# Patient Record
Sex: FEMALE
[De-identification: names, ages, dates, MRNs, and addresses within clinical notes are randomized per-mention and may not be internally consistent; named-entity substitution may affect disease eponyms.]

---

## 2021-03-31 ENCOUNTER — RESULT REVIEW (OUTPATIENT)
Age: 26
End: 2021-03-31

## 2023-05-08 ENCOUNTER — EMERGENCY (EMERGENCY)
Facility: HOSPITAL | Age: 28
LOS: 1 days | Discharge: HOME | End: 2023-05-08
Attending: EMERGENCY MEDICINE | Admitting: EMERGENCY MEDICINE
Payer: COMMERCIAL

## 2023-05-08 VITALS
DIASTOLIC BLOOD PRESSURE: 60 MMHG | RESPIRATION RATE: 18 BRPM | TEMPERATURE: 98 F | OXYGEN SATURATION: 99 % | HEART RATE: 98 BPM | SYSTOLIC BLOOD PRESSURE: 112 MMHG

## 2023-05-08 DIAGNOSIS — R68.84 JAW PAIN: Chronic | ICD-10-CM

## 2023-05-08 LAB
ALBUMIN SERPL ELPH-MCNC: 4.1 G/DL — SIGNIFICANT CHANGE UP (ref 3.3–5)
ALP SERPL-CCNC: 64 U/L — SIGNIFICANT CHANGE UP (ref 40–120)
ALT FLD-CCNC: 7 U/L — SIGNIFICANT CHANGE UP (ref 4–33)
ANION GAP SERPL CALC-SCNC: 14 MMOL/L — SIGNIFICANT CHANGE UP (ref 7–14)
ANISOCYTOSIS BLD QL: SLIGHT — SIGNIFICANT CHANGE UP
AST SERPL-CCNC: 10 U/L — SIGNIFICANT CHANGE UP (ref 4–32)
BASOPHILS # BLD AUTO: 0 K/UL — SIGNIFICANT CHANGE UP (ref 0–0.2)
BASOPHILS NFR BLD AUTO: 0 % — SIGNIFICANT CHANGE UP (ref 0–2)
BILIRUB SERPL-MCNC: 0.7 MG/DL — SIGNIFICANT CHANGE UP (ref 0.2–1.2)
BUN SERPL-MCNC: 10 MG/DL — SIGNIFICANT CHANGE UP (ref 7–23)
CALCIUM SERPL-MCNC: 9 MG/DL — SIGNIFICANT CHANGE UP (ref 8.4–10.5)
CHLORIDE SERPL-SCNC: 101 MMOL/L — SIGNIFICANT CHANGE UP (ref 98–107)
CO2 SERPL-SCNC: 23 MMOL/L — SIGNIFICANT CHANGE UP (ref 22–31)
CREAT SERPL-MCNC: 0.81 MG/DL — SIGNIFICANT CHANGE UP (ref 0.5–1.3)
EGFR: 101 ML/MIN/1.73M2 — SIGNIFICANT CHANGE UP
EOSINOPHIL # BLD AUTO: 0.27 K/UL — SIGNIFICANT CHANGE UP (ref 0–0.5)
EOSINOPHIL NFR BLD AUTO: 0.9 % — SIGNIFICANT CHANGE UP (ref 0–6)
GLUCOSE SERPL-MCNC: 105 MG/DL — HIGH (ref 70–99)
HCG SERPL-ACNC: <1 MIU/ML — SIGNIFICANT CHANGE UP
HCT VFR BLD CALC: 40 % — SIGNIFICANT CHANGE UP (ref 34.5–45)
HGB BLD-MCNC: 13.2 G/DL — SIGNIFICANT CHANGE UP (ref 11.5–15.5)
HYPOCHROMIA BLD QL: SLIGHT — SIGNIFICANT CHANGE UP
IANC: 24.79 K/UL — HIGH (ref 1.8–7.4)
LYMPHOCYTES # BLD AUTO: 1.55 K/UL — SIGNIFICANT CHANGE UP (ref 1–3.3)
LYMPHOCYTES # BLD AUTO: 5.2 % — LOW (ref 13–44)
MCHC RBC-ENTMCNC: 30.3 PG — SIGNIFICANT CHANGE UP (ref 27–34)
MCHC RBC-ENTMCNC: 33 GM/DL — SIGNIFICANT CHANGE UP (ref 32–36)
MCV RBC AUTO: 92 FL — SIGNIFICANT CHANGE UP (ref 80–100)
MONOCYTES # BLD AUTO: 0.51 K/UL — SIGNIFICANT CHANGE UP (ref 0–0.9)
MONOCYTES NFR BLD AUTO: 1.7 % — LOW (ref 2–14)
NEUTROPHILS # BLD AUTO: 26.94 K/UL — HIGH (ref 1.8–7.4)
NEUTROPHILS NFR BLD AUTO: 87 % — HIGH (ref 43–77)
NEUTS BAND # BLD: 3.5 % — SIGNIFICANT CHANGE UP (ref 0–6)
PLAT MORPH BLD: NORMAL — SIGNIFICANT CHANGE UP
PLATELET # BLD AUTO: 264 K/UL — SIGNIFICANT CHANGE UP (ref 150–400)
PLATELET COUNT - ESTIMATE: NORMAL — SIGNIFICANT CHANGE UP
POLYCHROMASIA BLD QL SMEAR: SLIGHT — SIGNIFICANT CHANGE UP
POTASSIUM SERPL-MCNC: 3.7 MMOL/L — SIGNIFICANT CHANGE UP (ref 3.5–5.3)
POTASSIUM SERPL-SCNC: 3.7 MMOL/L — SIGNIFICANT CHANGE UP (ref 3.5–5.3)
PROT SERPL-MCNC: 7 G/DL — SIGNIFICANT CHANGE UP (ref 6–8.3)
RBC # BLD: 4.35 M/UL — SIGNIFICANT CHANGE UP (ref 3.8–5.2)
RBC # FLD: 13.8 % — SIGNIFICANT CHANGE UP (ref 10.3–14.5)
RBC BLD AUTO: ABNORMAL
SODIUM SERPL-SCNC: 138 MMOL/L — SIGNIFICANT CHANGE UP (ref 135–145)
VARIANT LYMPHS # BLD: 1.7 % — SIGNIFICANT CHANGE UP (ref 0–6)
WBC # BLD: 29.77 K/UL — HIGH (ref 3.8–10.5)
WBC # FLD AUTO: 29.77 K/UL — HIGH (ref 3.8–10.5)

## 2023-05-08 PROCEDURE — 99223 1ST HOSP IP/OBS HIGH 75: CPT

## 2023-05-08 PROCEDURE — 70491 CT SOFT TISSUE NECK W/DYE: CPT | Mod: 26,MG

## 2023-05-08 PROCEDURE — G1004: CPT

## 2023-05-08 PROCEDURE — 93010 ELECTROCARDIOGRAM REPORT: CPT

## 2023-05-08 RX ORDER — KETOROLAC TROMETHAMINE 30 MG/ML
15 SYRINGE (ML) INJECTION ONCE
Refills: 0 | Status: DISCONTINUED | OUTPATIENT
Start: 2023-05-08 | End: 2023-05-08

## 2023-05-08 RX ORDER — SODIUM CHLORIDE 9 MG/ML
1000 INJECTION INTRAMUSCULAR; INTRAVENOUS; SUBCUTANEOUS
Refills: 0 | Status: DISCONTINUED | OUTPATIENT
Start: 2023-05-08 | End: 2023-05-12

## 2023-05-08 RX ORDER — LIDOCAINE 4 G/100G
15 CREAM TOPICAL ONCE
Refills: 0 | Status: COMPLETED | OUTPATIENT
Start: 2023-05-08 | End: 2023-05-08

## 2023-05-08 RX ORDER — KETOROLAC TROMETHAMINE 30 MG/ML
15 SYRINGE (ML) INJECTION EVERY 6 HOURS
Refills: 0 | Status: DISCONTINUED | OUTPATIENT
Start: 2023-05-08 | End: 2023-05-09

## 2023-05-08 RX ORDER — DEXAMETHASONE 0.5 MG/5ML
6 ELIXIR ORAL ONCE
Refills: 0 | Status: COMPLETED | OUTPATIENT
Start: 2023-05-08 | End: 2023-05-08

## 2023-05-08 RX ADMIN — Medication 15 MILLIGRAM(S): at 18:21

## 2023-05-08 RX ADMIN — Medication 15 MILLIGRAM(S): at 17:51

## 2023-05-08 RX ADMIN — LIDOCAINE 15 MILLILITER(S): 4 CREAM TOPICAL at 22:59

## 2023-05-08 RX ADMIN — Medication 6 MILLIGRAM(S): at 17:51

## 2023-05-08 RX ADMIN — Medication 15 MILLIGRAM(S): at 23:27

## 2023-05-08 RX ADMIN — Medication 100 MILLIGRAM(S): at 17:51

## 2023-05-08 RX ADMIN — SODIUM CHLORIDE 125 MILLILITER(S): 9 INJECTION INTRAMUSCULAR; INTRAVENOUS; SUBCUTANEOUS at 22:56

## 2023-05-08 RX ADMIN — Medication 15 MILLIGRAM(S): at 22:57

## 2023-05-08 NOTE — ED PROVIDER NOTE - ATTENDING APP SHARED VISIT CONTRIBUTION OF CARE
Gong: I have seen and examined the patient face to face, have reviewed and addended the HPI, PE and a/p as necessary.    28 yo F with no PMH a/w L sided throat pain and syncope today.  Pt reports she has been having L sided throat pain x 3 days.  Noted to have progressively worsening now with change in voice and is unable to eat.  Reports having some drooling.  Noted today to feel weak and while walking her dog she syncopized.  There was no head trauma.  Patient returned to normal conscious level within 2 seconds or less.  Upon waking up patient had no confusion or hazy like state.  Denies facial swelling, shortness of breath, dyspnea on exertion, lightheadedness, dizziness, fever, chills, neck swelling, neck stiffness, cough, congestion, abdominal pain, nausea, vomiting, diarrhea, rash, confusion, seizure-like activity, paresthesias, weakness, tinnitus, nosebleed, loose teeth, tasting blood, jaw pain, inability to open mouth, gum swelling, gum bleeding.    Noted to be seen in urgent care and was noted to be orthostatic on vitals.     GEN - NAD; well appearing; A+O x3; non-toxic appearing  HEAD: NCAT; EYES/NOSE: PERRLA, EOMI, no discharge; THROAT: Oral cavity and pharynx normal. + exudates with swelling on L paratonsilar area, minimal uvula deviation.    CARD -s1s2, RRR, no M,G,R;   PULM - CTA b/l, symmetric breath sounds;   ABD -  +BS, ND, NT, soft, no guarding, no rebound, no masses;   BACK - no CVA tenderness, Normal  spine;   EXT - symmetric pulses, 2+ dp, capillary refill < 2 seconds, no cyanosis, no edema;   NEURO - no focal neuro deficits, no slurred speech    Likely dehydration related syncope.  No chest pain, no shortness of breath eval for electrolyte abnl.  EKG wnl with no acute st or t wave changes.  Will obtain ct of soft tissue neck to r/o pta.  give steroids ivf, toradol and clina.  Reassess.  Possible need for ENT consult pending CT.

## 2023-05-08 NOTE — ED CDU PROVIDER INITIAL DAY NOTE - PHYSICAL EXAMINATION
Vital signs reviewed.   CONSTITUTIONAL: Pt appears uncomfortable, spitting up some saliva, in no apparent distress. Non-toxic appearing.   HEAD: Normocephalic, atraumatic.  EYES:  conjunctiva and sclera WNL.  ENT: normal nose; no rhinorrhea; Pt speaking in full sentences. Airway intact. +tonsillitis   NECK: Trachea midline   CARD: Normal S1, S2;   RESP: Normal chest excursion with respiration; breath sounds clear and equal bilaterally; no wheezes, rhonchi, or rales.  EXT/MS: moves all extremities;   SKIN: Normal for age and race; warm; dry; good turgor; no apparent lesions or exudate noted.  NEURO: Awake, alert, oriented x 3, no gross deficits  PSYCH: Normal mood; appropriate affect.

## 2023-05-08 NOTE — ED ADULT NURSE REASSESSMENT NOTE - NS ED NURSE REASSESS COMMENT FT1
pt a%ox4 c/o pain consistent with chief complaint. pt medicated as per md orders. pt respirations even and unlabored pt tolerating secretions. 20g to the LAC and RAC with no redness or swelling. pt pending CDU bed. pt appears in NAD

## 2023-05-08 NOTE — ED CDU PROVIDER INITIAL DAY NOTE - OBJECTIVE STATEMENT
Patient 27-year-old female no known pmhx who presents to ED c/o lt side throat pain x4 days. As per patient states that sx began on saturday with sore throat. Notes over past few days she has had increasing pain with swallowing and decreased PO intake. Pain worse on Lt side, notes increased secretions. Denies fevers, chills, prior tonsillitis/PTA, n/v/d, difficulty breathing, recent dental work/procedures or any other complaints.  Pt seen and worked up in ED; labs show wbc 29.7, other labs wnl, ct neck shows "Acute left palatine tonsillitis without evidence of peritonsillar or retropharyngeal abscess formation". Pt transferred to cdu for: iv abx, decadron, pain control, ADAT, vitals q4, am labs and frequent re-evals.

## 2023-05-08 NOTE — ED PROVIDER NOTE - THROAT FINDINGS
lt tonsil swollen with whit discharge. Pt spitting up salvia but no drooling/no redness/uvula midline/PERITONSILLAR ABSCESS/TONSILLAR SWELLING

## 2023-05-08 NOTE — ED PROVIDER NOTE - OBJECTIVE STATEMENT
27-year-old otherwise healthy female presents with pain in the left side of her throat for the past 3 days.      Patient states she had a telehealth visit 2 days ago and was prescribed steroids or Aleve but never started the medication.  Patient states the pain has persistently gotten worse, and she is now noticing a change in her voice that sounds muffled.  Patient states due to the pain she is unable to eat.  States today has some increased drooling.  Patient states due to an ability to eat for the past 2 days felt faint today, and had 1 episode of syncope.  Episode of syncope was witnessed, there was no head trauma.  Patient returned to normal conscious level within 2 seconds or less.  Upon waking up patient had no confusion or hazy like state.  Patient states despite increased drooling and decreased p.o. intake she is still able to breathe and has no symptoms of shortness of breath or feeling air hungry.    Patient declines facial swelling, shortness of breath, dyspnea on exertion, lightheadedness, dizziness, fever, chills, neck swelling, neck stiffness, cough, congestion, abdominal pain, nausea, vomiting, diarrhea, rash, confusion, seizure-like activity, paresthesias, weakness, tinnitus, nosebleed, loose teeth, tasting blood, jaw pain, inability to open mouth, gum swelling, gum bleeding.

## 2023-05-08 NOTE — ED ADULT TRIAGE NOTE - CHIEF COMPLAINT QUOTE
p.t c/o of rt sided throat pain and swelling for 2 days, p/t had a syncopal episode this am, unable to eat or drink for past 2 days due to pain, no neuro deficits or airway issues noted

## 2023-05-08 NOTE — ED PROVIDER NOTE - NSFOLLOWUPINSTRUCTIONS_ED_ALL_ED_FT
You were seen in the Emergency Room today and found to have a tonsillar infection/abscess.    Please take all medications as prescribed.     prompt reevaluation is necessary for:    ?Dyspnea    ?Worsening throat pain, neck pain, or trismus    ?Enlarging mass    ?Fever    ?Neck stiffness    ?Bleeding    If an abscess was drained you should have follow-up in 24 to 36 hours as per ENT. Please return if any new or worsening symptoms.

## 2023-05-08 NOTE — ED PROVIDER NOTE - CONSTITUTIONAL, MLM
normal... awake, alert, oriented to person, place, time/situation and in no apparent distress. Appears well but is constantly spitting up couch and has muffled voice.

## 2023-05-08 NOTE — ED PROVIDER NOTE - PROGRESS NOTE DETAILS
RIA Thomas : patient signed out to me to reassess after CT scan. CT showing: Acute left palatine tonsillitis without evidence of peritonsillar or retropharyngeal abscess formation.  Patient sip of water able to tolerate but with significant pain. will cdu for continued pain control, iv abx

## 2023-05-08 NOTE — ED CDU PROVIDER INITIAL DAY NOTE - DETAILS
Patient 27-year-old female no known pmhx who presents to ED c/o lt side throat pain x4 days. Pt seen and worked up in ED; labs show wbc 29.7, other labs wnl, ct neck shows "Acute left palatine tonsillitis without evidence of peritonsillar or retropharyngeal abscess formation". Pt transferred to cdu for: iv abx, decadron, pain control, ADAT, vitals q4, am labs and frequent re-evals.

## 2023-05-08 NOTE — ED ADULT NURSE NOTE - OBJECTIVE STATEMENT
27 yof presents A&Ox4, c/o right sided throat pain and throat swelling x2 days. Pt also states she had a syncopal episode this morning. Unsure of head trauma. Denies any LOC, blood thinner use. Denies any PMHx. Respirations even and unlabored, speaking in full sentences without any difficulty. Pt denies any chest pain or sob. No drooling noted. Pt arrived w/ 20g IV to L AC placed by EMS. Flushes without difficulty. 20g IV placed to R AC.Labs sent. pending results. bed in lowest position, side rails up, call bell in hand, safety maintained.

## 2023-05-08 NOTE — ED PROVIDER NOTE - CLINICAL SUMMARY MEDICAL DECISION MAKING FREE TEXT BOX
27-year-old otherwise healthy female presents with pain in the left side of her throat for the past 3 days.      lt sided peritonsillar abscess  normal vitals, well appearing, Normal ROM of neck, tolerating secretion but is spitting up salvia, and is protecting airway  unlikely RPA, udlce's, epiglottis, strep, EBV.    Syncope is likely unrelated to cardiac or neurological sxs. likely 2/2 vagal episode. glucose 90s WNL.     Plan: CT soft neck, CBC, decadron, clindamycin, Toradol, IVF

## 2023-05-08 NOTE — ED ADULT NURSE NOTE - FINAL NURSING ELECTRONIC SIGNATURE
Patient was called and appointment was bumped earlier.  No further action is needed at this time.      Guerda Valdes, CMA        09-May-2023 00:17

## 2023-05-08 NOTE — ED CDU PROVIDER INITIAL DAY NOTE - ATTENDING APP SHARED VISIT CONTRIBUTION OF CARE
Gong: I have seen and examined the patient face to face, have reviewed and addended the HPI, PE and a/p as necessary.     26 yo F with no PMH a/w L sided throat pain and syncope today.  Pt reports she has been having L sided throat pain x 3 days.  Noted to have progressively worsening now with change in voice and is unable to eat.  Reports having some drooling.  Noted today to feel weak and while walking her dog she syncopized.  There was no head trauma.  Patient returned to normal conscious level within 2 seconds or less.  Upon waking up patient had no confusion or hazy like state.  Denies facial swelling, shortness of breath, dyspnea on exertion, lightheadedness, dizziness, fever, chills, neck swelling, neck stiffness, cough, congestion, abdominal pain, nausea, vomiting, diarrhea, rash, confusion, seizure-like activity, paresthesias, weakness, tinnitus, nosebleed, loose teeth, tasting blood, jaw pain, inability to open mouth, gum swelling, gum bleeding.    Noted to be seen in urgent care and was noted to be orthostatic on vitals.     GEN - NAD; well appearing; A+O x3; non-toxic appearing  HEAD: NCAT; EYES/NOSE: PERRLA, EOMI, no discharge; THROAT: Oral cavity and pharynx normal. + exudates with swelling on L paratonsilar area, minimal uvula deviation.    CARD -s1s2, RRR, no M,G,R;   PULM - CTA b/l, symmetric breath sounds;   ABD -  +BS, ND, NT, soft, no guarding, no rebound, no masses;   BACK - no CVA tenderness, Normal  spine;   EXT - symmetric pulses, 2+ dp, capillary refill < 2 seconds, no cyanosis, no edema;   NEURO - no focal neuro deficits, no slurred speech    Likely dehydration related syncope.  No chest pain, no shortness of breath eval for electrolyte abnl.  EKG wnl with no acute st or t wave changes.  In ED, CT showed tonsilitis with no abscess.  Pt noted to be persistently uncomfortable, will continue ivf, iv abx, decadron and pain cotnrol in CDU.  Re-eval in am.

## 2023-05-09 VITALS
RESPIRATION RATE: 18 BRPM | OXYGEN SATURATION: 100 % | DIASTOLIC BLOOD PRESSURE: 67 MMHG | HEART RATE: 76 BPM | SYSTOLIC BLOOD PRESSURE: 105 MMHG

## 2023-05-09 LAB
ALBUMIN SERPL ELPH-MCNC: 4 G/DL — SIGNIFICANT CHANGE UP (ref 3.3–5)
ALP SERPL-CCNC: 71 U/L — SIGNIFICANT CHANGE UP (ref 40–120)
ALT FLD-CCNC: 7 U/L — SIGNIFICANT CHANGE UP (ref 4–33)
ANION GAP SERPL CALC-SCNC: 14 MMOL/L — SIGNIFICANT CHANGE UP (ref 7–14)
AST SERPL-CCNC: 9 U/L — SIGNIFICANT CHANGE UP (ref 4–32)
BASOPHILS # BLD AUTO: 0.03 K/UL — SIGNIFICANT CHANGE UP (ref 0–0.2)
BASOPHILS NFR BLD AUTO: 0.1 % — SIGNIFICANT CHANGE UP (ref 0–2)
BILIRUB SERPL-MCNC: 0.4 MG/DL — SIGNIFICANT CHANGE UP (ref 0.2–1.2)
BUN SERPL-MCNC: 10 MG/DL — SIGNIFICANT CHANGE UP (ref 7–23)
CALCIUM SERPL-MCNC: 9.1 MG/DL — SIGNIFICANT CHANGE UP (ref 8.4–10.5)
CHLORIDE SERPL-SCNC: 104 MMOL/L — SIGNIFICANT CHANGE UP (ref 98–107)
CO2 SERPL-SCNC: 20 MMOL/L — LOW (ref 22–31)
CREAT SERPL-MCNC: 0.65 MG/DL — SIGNIFICANT CHANGE UP (ref 0.5–1.3)
EGFR: 122 ML/MIN/1.73M2 — SIGNIFICANT CHANGE UP
EOSINOPHIL # BLD AUTO: 0 K/UL — SIGNIFICANT CHANGE UP (ref 0–0.5)
EOSINOPHIL NFR BLD AUTO: 0 % — SIGNIFICANT CHANGE UP (ref 0–6)
GLUCOSE SERPL-MCNC: 133 MG/DL — HIGH (ref 70–99)
HCT VFR BLD CALC: 35.8 % — SIGNIFICANT CHANGE UP (ref 34.5–45)
HETEROPH AB TITR SER AGGL: NEGATIVE — SIGNIFICANT CHANGE UP
HGB BLD-MCNC: 11.9 G/DL — SIGNIFICANT CHANGE UP (ref 11.5–15.5)
IANC: 27.06 K/UL — HIGH (ref 1.8–7.4)
IMM GRANULOCYTES NFR BLD AUTO: 1.4 % — HIGH (ref 0–0.9)
LYMPHOCYTES # BLD AUTO: 0.72 K/UL — LOW (ref 1–3.3)
LYMPHOCYTES # BLD AUTO: 2.5 % — LOW (ref 13–44)
MCHC RBC-ENTMCNC: 29.2 PG — SIGNIFICANT CHANGE UP (ref 27–34)
MCHC RBC-ENTMCNC: 33.2 GM/DL — SIGNIFICANT CHANGE UP (ref 32–36)
MCV RBC AUTO: 88 FL — SIGNIFICANT CHANGE UP (ref 80–100)
MONOCYTES # BLD AUTO: 0.54 K/UL — SIGNIFICANT CHANGE UP (ref 0–0.9)
MONOCYTES NFR BLD AUTO: 1.9 % — LOW (ref 2–14)
NEUTROPHILS # BLD AUTO: 27.06 K/UL — HIGH (ref 1.8–7.4)
NEUTROPHILS NFR BLD AUTO: 94.1 % — HIGH (ref 43–77)
NRBC # BLD: 0 /100 WBCS — SIGNIFICANT CHANGE UP (ref 0–0)
NRBC # FLD: 0 K/UL — SIGNIFICANT CHANGE UP (ref 0–0)
PLATELET # BLD AUTO: 266 K/UL — SIGNIFICANT CHANGE UP (ref 150–400)
POTASSIUM SERPL-MCNC: 4.1 MMOL/L — SIGNIFICANT CHANGE UP (ref 3.5–5.3)
POTASSIUM SERPL-SCNC: 4.1 MMOL/L — SIGNIFICANT CHANGE UP (ref 3.5–5.3)
PROT SERPL-MCNC: 7.2 G/DL — SIGNIFICANT CHANGE UP (ref 6–8.3)
RBC # BLD: 4.07 M/UL — SIGNIFICANT CHANGE UP (ref 3.8–5.2)
RBC # FLD: 13.9 % — SIGNIFICANT CHANGE UP (ref 10.3–14.5)
SODIUM SERPL-SCNC: 138 MMOL/L — SIGNIFICANT CHANGE UP (ref 135–145)
WBC # BLD: 28.75 K/UL — HIGH (ref 3.8–10.5)
WBC # FLD AUTO: 28.75 K/UL — HIGH (ref 3.8–10.5)

## 2023-05-09 PROCEDURE — 99238 HOSP IP/OBS DSCHRG MGMT 30/<: CPT

## 2023-05-09 RX ORDER — DEXAMETHASONE 0.5 MG/5ML
8 ELIXIR ORAL EVERY 8 HOURS
Refills: 0 | Status: COMPLETED | OUTPATIENT
Start: 2023-05-09 | End: 2023-05-09

## 2023-05-09 RX ADMIN — Medication 100 MILLIGRAM(S): at 02:21

## 2023-05-09 RX ADMIN — Medication 15 MILLIGRAM(S): at 08:09

## 2023-05-09 RX ADMIN — Medication 100 MILLIGRAM(S): at 10:54

## 2023-05-09 RX ADMIN — Medication 101.6 MILLIGRAM(S): at 11:35

## 2023-05-09 RX ADMIN — SODIUM CHLORIDE 125 MILLILITER(S): 9 INJECTION INTRAMUSCULAR; INTRAVENOUS; SUBCUTANEOUS at 02:20

## 2023-05-09 RX ADMIN — Medication 101.6 MILLIGRAM(S): at 03:14

## 2023-05-09 RX ADMIN — SODIUM CHLORIDE 125 MILLILITER(S): 9 INJECTION INTRAMUSCULAR; INTRAVENOUS; SUBCUTANEOUS at 07:39

## 2023-05-09 RX ADMIN — Medication 15 MILLIGRAM(S): at 07:39

## 2023-05-09 NOTE — ED CDU PROVIDER SUBSEQUENT DAY NOTE - PROGRESS NOTE DETAILS
Patient reassessed reports improvement in symptoms, is able to tolerate p.o., has remained afebrile.  Will discharge home with prescription for clindamycin, primary care and ENT follow-up.  Patient will return to the ER with any worsening or concerning symptoms, throat swelling, worsening pain, fevers, drooling or any other concerns.  Discharge discussed with CDU attending.

## 2023-05-09 NOTE — ED CDU PROVIDER DISPOSITION NOTE - PATIENT PORTAL LINK FT
You can access the FollowMyHealth Patient Portal offered by [facility] by registering at the following website: [email or web address]. By joining [employer]’s FollowMyHealth portal, you will also be able to view your health information using other applications (apps) compatible with our system.

## 2023-05-09 NOTE — ED CDU PROVIDER DISPOSITION NOTE - CLINICAL COURSE
27-year-old female no known pmhx who presents to ED c/o lt side throat pain x4 days. Pt seen and worked up in ED; labs show wbc 29.7, other labs wnl, ct neck shows "Acute left palatine tonsillitis without evidence of peritonsillar or retropharyngeal abscess formation". Pt transferred to cdu for: iv abx, decadron, pain control, ADAT, vitals q4, am labs and frequent re-evals. 27-year-old female no known pmhx who presents to ED c/o lt side throat pain x4 days. Pt seen and worked up in ED; labs show wbc 29.7, other labs wnl, ct neck shows "Acute left palatine tonsillitis without evidence of peritonsillar or retropharyngeal abscess formation". Pt transferred to cdu for: iv abx, decadron, pain control, ADAT, vitals q4, am labs and frequent re-evals. Pt reports improvement in symptoms this afternoon, has remained afebrile, is well appearing, tolerating PO. Discussed strict return precautions, pt to f/u with her PMD and ENT as needed.

## 2023-05-09 NOTE — ED CDU PROVIDER DISPOSITION NOTE - NSFOLLOWUPINSTRUCTIONS_ED_ALL_ED_FT
Follow-up with your primary care doctor within 1 week.  Follow-up with an ENT if symptoms persist, referral list attached please call to make an appointment.  Take clindamycin 300 mg (1 tab) every 8 hours for 10 days.  Take ibuprofen 600 mg every 6-8 hours as needed for pain, take with food.  – You can also take Tylenol 650 mg every 6 hours.  Return to the ER with any worsening or concerning symptoms, increased pain, drooling, difficulty swallowing, neck pain, fever/chills, weakness or any other concerns.

## 2023-05-09 NOTE — ED ADULT NURSE REASSESSMENT NOTE - NS ED NURSE REASSESS COMMENT FT1
report given to cdu rn sonia. pt a&ox4 tolerating secretions. pt 20g to the LAC and RAC in tact. pt respirations even and unlabored with no accessory muscle use. pt appears in NAD and moved to CDU bed 2

## 2023-05-09 NOTE — ED POST DISCHARGE NOTE - REASON FOR FOLLOW-UP
Other Pt unable to fill abx prescription, upon review script never sent. Clindamycin 300mg TID x10 days sent to [facility] in [place].

## 2023-05-10 LAB
CULTURE RESULTS: SIGNIFICANT CHANGE UP
SPECIMEN SOURCE: SIGNIFICANT CHANGE UP

## 2024-05-16 ENCOUNTER — NON-APPOINTMENT (OUTPATIENT)
Age: 29
End: 2024-05-16

## 2024-07-29 ENCOUNTER — NON-APPOINTMENT (OUTPATIENT)
Age: 29
End: 2024-07-29

## 2025-06-24 ENCOUNTER — NON-APPOINTMENT (OUTPATIENT)
Age: 30
End: 2025-06-24